# Patient Record
Sex: MALE
[De-identification: names, ages, dates, MRNs, and addresses within clinical notes are randomized per-mention and may not be internally consistent; named-entity substitution may affect disease eponyms.]

---

## 2018-02-27 ENCOUNTER — APPOINTMENT (OUTPATIENT)
Dept: NEUROLOGY | Facility: CLINIC | Age: 58
End: 2018-02-27
Payer: MEDICAID

## 2018-02-27 VITALS
OXYGEN SATURATION: 100 % | HEIGHT: 71 IN | HEART RATE: 98 BPM | DIASTOLIC BLOOD PRESSURE: 89 MMHG | SYSTOLIC BLOOD PRESSURE: 148 MMHG | WEIGHT: 180 LBS | BODY MASS INDEX: 25.2 KG/M2

## 2018-02-27 DIAGNOSIS — Z86.79 PERSONAL HISTORY OF OTHER DISEASES OF THE CIRCULATORY SYSTEM: ICD-10-CM

## 2018-02-27 DIAGNOSIS — Z86.718 PERSONAL HISTORY OF OTHER VENOUS THROMBOSIS AND EMBOLISM: ICD-10-CM

## 2018-02-27 DIAGNOSIS — Z78.9 OTHER SPECIFIED HEALTH STATUS: ICD-10-CM

## 2018-02-27 DIAGNOSIS — Z83.3 FAMILY HISTORY OF DIABETES MELLITUS: ICD-10-CM

## 2018-02-27 DIAGNOSIS — F17.200 NICOTINE DEPENDENCE, UNSPECIFIED, UNCOMPLICATED: ICD-10-CM

## 2018-02-27 DIAGNOSIS — Z86.39 PERSONAL HISTORY OF OTHER ENDOCRINE, NUTRITIONAL AND METABOLIC DISEASE: ICD-10-CM

## 2018-02-27 DIAGNOSIS — Z80.3 FAMILY HISTORY OF MALIGNANT NEOPLASM OF BREAST: ICD-10-CM

## 2018-02-27 DIAGNOSIS — Z60.2 PROBLEMS RELATED TO LIVING ALONE: ICD-10-CM

## 2018-02-27 DIAGNOSIS — I87.2 VENOUS INSUFFICIENCY (CHRONIC) (PERIPHERAL): ICD-10-CM

## 2018-02-27 DIAGNOSIS — Z82.49 FAMILY HISTORY OF ISCHEMIC HEART DISEASE AND OTHER DISEASES OF THE CIRCULATORY SYSTEM: ICD-10-CM

## 2018-02-27 DIAGNOSIS — Z86.69 PERSONAL HISTORY OF OTHER DISEASES OF THE NERVOUS SYSTEM AND SENSE ORGANS: ICD-10-CM

## 2018-02-27 PROCEDURE — 99203 OFFICE O/P NEW LOW 30 MIN: CPT

## 2018-02-27 RX ORDER — ASPIRIN 81 MG
81 TABLET, DELAYED RELEASE (ENTERIC COATED) ORAL DAILY
Qty: 90 | Refills: 1 | Status: ACTIVE | COMMUNITY

## 2018-02-27 RX ORDER — ACETAMINOPHEN 325 MG/1
325 TABLET, FILM COATED ORAL
Refills: 0 | Status: ACTIVE | COMMUNITY

## 2018-02-27 RX ORDER — METOPROLOL TARTRATE 100 MG/1
100 TABLET, FILM COATED ORAL DAILY
Refills: 0 | Status: ACTIVE | COMMUNITY

## 2018-02-27 RX ORDER — LISINOPRIL 20 MG/1
20 TABLET ORAL DAILY
Refills: 0 | Status: ACTIVE | COMMUNITY

## 2018-02-27 RX ORDER — ATORVASTATIN CALCIUM 80 MG/1
80 TABLET, FILM COATED ORAL DAILY
Qty: 30 | Refills: 6 | Status: ACTIVE | COMMUNITY

## 2018-02-27 SDOH — SOCIAL STABILITY - SOCIAL INSECURITY: PROBLEMS RELATED TO LIVING ALONE: Z60.2

## 2018-04-10 ENCOUNTER — LABORATORY RESULT (OUTPATIENT)
Age: 58
End: 2018-04-10

## 2018-04-10 ENCOUNTER — APPOINTMENT (OUTPATIENT)
Dept: NEUROLOGY | Facility: CLINIC | Age: 58
End: 2018-04-10
Payer: MEDICAID

## 2018-04-10 VITALS — WEIGHT: 187 LBS | BODY MASS INDEX: 26.08 KG/M2

## 2018-04-10 VITALS
SYSTOLIC BLOOD PRESSURE: 133 MMHG | BODY MASS INDEX: 25.2 KG/M2 | HEART RATE: 98 BPM | HEIGHT: 71 IN | WEIGHT: 180 LBS | DIASTOLIC BLOOD PRESSURE: 78 MMHG | OXYGEN SATURATION: 99 %

## 2018-04-10 DIAGNOSIS — Z00.00 ENCOUNTER FOR GENERAL ADULT MEDICAL EXAMINATION W/OUT ABNORMAL FINDINGS: ICD-10-CM

## 2018-04-10 PROCEDURE — 95913 NRV CNDJ TEST 13/> STUDIES: CPT

## 2018-04-10 PROCEDURE — 95885 MUSC TST DONE W/NERV TST LIM: CPT | Mod: 59

## 2018-04-11 LAB
ACE BLD-CCNC: 6 U/L
ALBUMIN MFR SERPL ELPH: 53.5 %
ALBUMIN SERPL-MCNC: 4.5 G/DL
ALBUMIN/GLOB SERPL: 1.2 RATIO
ALPHA1 GLOB MFR SERPL ELPH: 3.6 %
ALPHA1 GLOB SERPL ELPH-MCNC: 0.3 G/DL
ALPHA2 GLOB MFR SERPL ELPH: 10.6 %
ALPHA2 GLOB SERPL ELPH-MCNC: 0.9 G/DL
B-GLOBULIN MFR SERPL ELPH: 9.4 %
B-GLOBULIN SERPL ELPH-MCNC: 0.8 G/DL
CRP SERPL-MCNC: <0.2 MG/DL
ENA RNP AB SER IA-ACNC: 0.3 AL
ENA SM AB SER IA-ACNC: <0.2 AL
ENA SS-A AB SER IA-ACNC: <0.2 AL
ENA SS-B AB SER IA-ACNC: <0.2 AL
ERYTHROCYTE [SEDIMENTATION RATE] IN BLOOD BY WESTERGREN METHOD: 30 MM/HR
GAMMA GLOB FLD ELPH-MCNC: 1.9 G/DL
GAMMA GLOB MFR SERPL ELPH: 22.9 %
GLIADIN IGA SER QL: <5 UNITS
GLIADIN IGG SER QL: <5 UNITS
GLIADIN PEPTIDE IGA SER-ACNC: NEGATIVE
GLIADIN PEPTIDE IGG SER-ACNC: NEGATIVE
INTERPRETATION SERPL IEP-IMP: NORMAL
M PROTEIN SPEC IFE-MCNC: NORMAL
PROT SERPL-MCNC: 8.4 G/DL
PROT SERPL-MCNC: 8.4 G/DL
RHEUMATOID FACT SER QL: <7 IU/ML
TSH SERPL-ACNC: 1.85 UIU/ML
TTG IGA SER IA-ACNC: <5 UNITS
TTG IGA SER-ACNC: NEGATIVE
TTG IGG SER IA-ACNC: <5 UNITS
TTG IGG SER IA-ACNC: NEGATIVE

## 2018-04-12 LAB
ANA SER IF-ACNC: NEGATIVE
ENDOMYSIUM IGA SER QL: NEGATIVE
ENDOMYSIUM IGA TITR SER: NORMAL

## 2018-04-13 LAB
ALBUPE: 25.9 %
ALPHA1UPE: 6.3 %
ALPHA2UPE: 17.2 %
BETAUPE: 15.2 %
CREAT 24H UR-MCNC: NORMAL G/24 H
CREATININE UR (MAYO): 270 MG/DL
CRYOGLOB SERPL-MCNC: NEGATIVE
GAMMAUPE: 35.4 %
IGA 24H UR QL IFE: NORMAL
KAPPA LC 24H UR QL: PRESENT
PROT PATTERN 24H UR ELPH-IMP: NORMAL
PROT UR-MCNC: 16 MG/DL
PROT UR-MCNC: 16 MG/DL
SPECIMEN VOL 24H UR: NORMAL

## 2018-04-16 LAB — HU AB SER QL: NEGATIVE

## 2018-04-17 LAB — MAG AB SER QL: NEGATIVE

## 2018-05-29 ENCOUNTER — APPOINTMENT (OUTPATIENT)
Dept: NEUROLOGY | Facility: CLINIC | Age: 58
End: 2018-05-29
Payer: MEDICAID

## 2018-05-29 VITALS
WEIGHT: 198 LBS | SYSTOLIC BLOOD PRESSURE: 129 MMHG | DIASTOLIC BLOOD PRESSURE: 81 MMHG | HEIGHT: 71 IN | BODY MASS INDEX: 27.72 KG/M2 | HEART RATE: 91 BPM | OXYGEN SATURATION: 98 %

## 2018-05-29 PROCEDURE — 62270 DX LMBR SPI PNXR: CPT

## 2018-05-29 PROCEDURE — 64611 CHEMODENERV SALIV GLANDS: CPT

## 2018-05-29 RX ORDER — CLOPIDOGREL 75 MG/1
75 TABLET, FILM COATED ORAL DAILY
Refills: 0 | Status: DISCONTINUED | COMMUNITY
End: 2018-05-29

## 2018-05-30 LAB
ALBUMIN CSF ELPH-MCNC: 34.5 MG/DL
ALBUMIN SERPL-MCNC: 3809 MG/DL
ALBUMIN/GLOB CSF: 0.29 RATIO
APPEARANCE CSF: CLEAR
CELL FRACT CSF-IMP: NORMAL
COLOR CSF: NORMAL
GAMMA GLOB CSF ELPH-MCNC: 9.9 MG/DL
GAMMA GLOB MFR CSF ELPH: 2.2 MG/DAY
GLUCOSE CSF-MCNC: 103 MG/DL
IGG SER QL IEP: 1998 MG/DL
IGG/ALB CLEAR SER+CSF-RTO: 0.5
IGG/ALB SER: 0.52 RATIO
NEUTROPHILS NFR CSF MANUAL: NORMAL
PROT CSF-MCNC: 51 MG/DL
RBC # CSF MANUAL: 0 /UL
TOTAL CELLS COUNTED CSF: 0 /UL
TUBE TYPE: NORMAL

## 2018-06-12 ENCOUNTER — APPOINTMENT (OUTPATIENT)
Dept: NEUROLOGY | Facility: CLINIC | Age: 58
End: 2018-06-12

## 2018-06-19 LAB
ALBUMIN CSF ELPH-MCNC: 34.5 MG/DL
ALBUMIN MFR CSF ELPH: 51 MG/DL
ALBUMIN/GLOB CSF: 0.29 RATIO
GAMMA GLOB CSF ELPH-MCNC: 9.9 MG/DL
GAMMA GLOB MFR CSF ELPH: 2.2 MG/DAY
OLIGOCLONAL BANDS CSF ELPH-IMP: NORMAL
OLIGOCLONAL BANDS CSF ELPH-IMP: NORMAL
PROT CSF-MCNC: 51 MG/DL
PROT PATTERN CSF ELPH-IMP: NORMAL
SULFATIDE AB SER QL: NORMAL

## 2018-06-26 ENCOUNTER — MEDICATION RENEWAL (OUTPATIENT)
Age: 58
End: 2018-06-26

## 2018-09-07 ENCOUNTER — MEDICATION RENEWAL (OUTPATIENT)
Age: 58
End: 2018-09-07

## 2018-10-01 ENCOUNTER — MEDICATION RENEWAL (OUTPATIENT)
Age: 58
End: 2018-10-01

## 2019-02-28 ENCOUNTER — APPOINTMENT (OUTPATIENT)
Dept: NEUROLOGY | Facility: CLINIC | Age: 59
End: 2019-02-28

## 2019-04-22 ENCOUNTER — MEDICATION RENEWAL (OUTPATIENT)
Age: 59
End: 2019-04-22

## 2019-07-05 ENCOUNTER — APPOINTMENT (OUTPATIENT)
Dept: NEUROLOGY | Facility: CLINIC | Age: 59
End: 2019-07-05

## 2019-11-08 ENCOUNTER — APPOINTMENT (OUTPATIENT)
Dept: NEUROLOGY | Facility: CLINIC | Age: 59
End: 2019-11-08

## 2020-04-20 ENCOUNTER — RX RENEWAL (OUTPATIENT)
Age: 60
End: 2020-04-20

## 2020-05-19 ENCOUNTER — RX RENEWAL (OUTPATIENT)
Age: 60
End: 2020-05-19

## 2020-06-22 ENCOUNTER — RX RENEWAL (OUTPATIENT)
Age: 60
End: 2020-06-22

## 2020-07-24 ENCOUNTER — APPOINTMENT (OUTPATIENT)
Dept: NEUROLOGY | Facility: CLINIC | Age: 60
End: 2020-07-24
Payer: MEDICARE

## 2020-07-24 PROCEDURE — 99214 OFFICE O/P EST MOD 30 MIN: CPT | Mod: 95

## 2020-07-24 RX ORDER — GABAPENTIN 800 MG/1
800 TABLET, COATED ORAL 3 TIMES DAILY
Qty: 180 | Refills: 0 | Status: DISCONTINUED | COMMUNITY
Start: 2020-06-22 | End: 2020-07-24

## 2020-07-24 RX ORDER — GABAPENTIN 800 MG/1
800 TABLET, COATED ORAL 3 TIMES DAILY
Qty: 180 | Refills: 0 | Status: DISCONTINUED | COMMUNITY
Start: 2020-05-19 | End: 2020-07-24

## 2020-07-24 NOTE — ASSESSMENT
[FreeTextEntry1] : Switch gabapentin to lyrica 200mg TID\par Continue effexor\par May need to take extra gabapentin as needed\par f/u in person in 6-8 weeks

## 2020-07-24 NOTE — HISTORY OF PRESENT ILLNESS
[FreeTextEntry1] : \par Last seen about 2 years ago\par Pain is getting worse, shooting pain throughout his body\par Also feels he is losing muscle tone in his legs and arms\par He feels that gabapentin is not as effective anymore \par He stopped effexor for a while since he couldn't get a refill; after restarting it a month ago pain has been better somewhat

## 2020-09-11 ENCOUNTER — NON-APPOINTMENT (OUTPATIENT)
Age: 60
End: 2020-09-11

## 2020-09-11 ENCOUNTER — APPOINTMENT (OUTPATIENT)
Dept: NEUROLOGY | Facility: CLINIC | Age: 60
End: 2020-09-11
Payer: MEDICARE

## 2020-09-11 VITALS
SYSTOLIC BLOOD PRESSURE: 130 MMHG | TEMPERATURE: 97.9 F | HEIGHT: 71 IN | DIASTOLIC BLOOD PRESSURE: 95 MMHG | OXYGEN SATURATION: 97 % | HEART RATE: 121 BPM | WEIGHT: 190 LBS | BODY MASS INDEX: 26.6 KG/M2

## 2020-09-11 DIAGNOSIS — G62.89 OTHER SPECIFIED POLYNEUROPATHIES: ICD-10-CM

## 2020-09-11 PROCEDURE — 99214 OFFICE O/P EST MOD 30 MIN: CPT

## 2020-09-11 RX ORDER — PREGABALIN 200 MG/1
200 CAPSULE ORAL
Qty: 90 | Refills: 5 | Status: DISCONTINUED | COMMUNITY
Start: 2020-07-24 | End: 2020-09-11

## 2020-09-11 NOTE — HISTORY OF PRESENT ILLNESS
[FreeTextEntry1] : Neuropathic pain has been worse \par He stopped taking lyrica with some increase in pain\par He restarted effexor with some improvement\par Pain is all over body - feet, legs, arms, neck\par Feels like burning, tingling, numbness, severe\par \par Reviewed: MRI L spine from 2018; labs from 2018 including CSF\par \par PMH: diet controlled DM\par \par ROS: no weakness; + imbalance

## 2020-09-11 NOTE — ASSESSMENT
[FreeTextEntry1] : Stop lyrica\par continue gabapentin 1600mg TID\par increase effexor to 75mg BID\par neck stretching exercises through exercise Rx\par f/u 2-3 months consider occipital nerve blocks at that time\par Consider nortriptyline or spinal cord stimulator in the future

## 2020-09-11 NOTE — PHYSICAL EXAM
[FreeTextEntry1] : Constitutional: alert and in no acute distress. \par Psychiatric: oriented to person, place, and time, insight and judgment were intact and the affect was normal. \par Neurologic: \par Attention: normal concentrating ability. \par Language: fluency intact and comprehension intact. \par Fund of knowledge: displays adequate knowledge of personal past history and adequate range of vocabulary. \par Cranial Nerves: visual acuity intact bilaterally, visual fields full to confrontation, pupils equal round and reactive to light, extraocular motion intact, facial sensation intact symmetrically, face symmetrical, hearing was intact bilaterally, tongue and palate midline, head turning and shoulder shrug symmetric and there was no tongue deviation with protrusion. \par \par Motor: \par Atrophy of intrinsic foot muscles b/l; EHL/EDB 2/5 otherwise 5/5 throughout \par \par Sensory exam: Romberg's sign was negative. Cold sensation diminished in length dependent manner in legs b/l; vibration absent in feet, mod-sev reduced at ankles, mildly at knees. \par \par Coordination: Finger to nose dysmetria was not present. \par \par Gait: right foot everted; narrow base, sways mildly, cannot walk on heels or toes, cannot tandem. \par \par Deep tendon reflexes: \par Triceps 1+ b/l, absent otherwise \par \par MSK: severe tenderness of cervical paraspinals at insertion of occipiut R > L

## 2020-12-23 ENCOUNTER — APPOINTMENT (OUTPATIENT)
Dept: NEUROLOGY | Facility: CLINIC | Age: 60
End: 2020-12-23

## 2021-01-12 ENCOUNTER — APPOINTMENT (OUTPATIENT)
Dept: NEUROLOGY | Facility: CLINIC | Age: 61
End: 2021-01-12
Payer: MEDICARE

## 2021-01-12 PROCEDURE — 99213 OFFICE O/P EST LOW 20 MIN: CPT | Mod: 95

## 2021-01-12 RX ORDER — VENLAFAXINE 75 MG/1
75 TABLET ORAL TWICE DAILY
Qty: 180 | Refills: 3 | Status: DISCONTINUED | COMMUNITY
Start: 2018-06-26 | End: 2021-01-12

## 2021-01-12 RX ORDER — GABAPENTIN 800 MG/1
800 TABLET, COATED ORAL 3 TIMES DAILY
Qty: 90 | Refills: 5 | Status: DISCONTINUED | COMMUNITY
Start: 2018-04-10 | End: 2021-01-12

## 2021-01-12 NOTE — ASSESSMENT
[FreeTextEntry1] : Stop effexor \par Start nortriptyline 25 at night after 1 week increase to 25 BID\par continue gabapentin - try to limit to 1600mg TID \par f/u in 1 month

## 2021-01-12 NOTE — HISTORY OF PRESENT ILLNESS
[FreeTextEntry1] : \par Neuropathic pain is still bad - back to taking gabapentin 2400mg TID \par He feels that effexor aggravates the pain \par He saw pain management years ago for post-op pain\par

## 2021-01-12 NOTE — REASON FOR VISIT
[Follow-Up: _____] : a [unfilled] follow-up visit [Home] : at home, [unfilled] , at the time of the visit. [Medical Office: ([place] [facility])___] : at the medical office located in  [Verbal consent obtained from patient] : the patient, [unfilled]

## 2021-02-22 ENCOUNTER — APPOINTMENT (OUTPATIENT)
Dept: NEUROLOGY | Facility: CLINIC | Age: 61
End: 2021-02-22
Payer: MEDICARE

## 2021-02-22 PROCEDURE — 99213 OFFICE O/P EST LOW 20 MIN: CPT | Mod: 95

## 2021-02-22 RX ORDER — NORTRIPTYLINE HYDROCHLORIDE 25 MG/1
25 CAPSULE ORAL
Qty: 60 | Refills: 5 | Status: DISCONTINUED | COMMUNITY
Start: 2021-01-12 | End: 2021-02-22

## 2021-02-22 NOTE — ASSESSMENT
[FreeTextEntry1] : Continue gabapentin for now - can take 1600mg QID (encouraged to stick to that dosing)\par Consider "washout" period in a few months if symptoms are controlled, then going on lyrica or potentially a lower dose of gabapentin - he is in agreement

## 2021-02-22 NOTE — HISTORY OF PRESENT ILLNESS
[FreeTextEntry1] : He tried nortriptyline but it made him too drowsy so he stopped\par He is still taking gabapentin 2400mg TID, which takes about two hours to kick in, but overall controls the pain\par Balance is ok, walks for short periods and perform all usual functions, but gets tired easily

## 2021-02-22 NOTE — REASON FOR VISIT
[Follow-Up: _____] : a [unfilled] follow-up visit [Home] : at home, [unfilled] , at the time of the visit. [Medical Office: ([facility])___] : at the medical office located in  [Verbal consent obtained from patient] : the patient, [unfilled]

## 2021-04-09 RX ORDER — GABAPENTIN 800 MG/1
800 TABLET, COATED ORAL 4 TIMES DAILY
Qty: 720 | Refills: 3 | Status: ACTIVE | COMMUNITY
Start: 2020-04-20

## 2021-10-01 ENCOUNTER — NON-APPOINTMENT (OUTPATIENT)
Age: 61
End: 2021-10-01

## 2021-10-01 ENCOUNTER — APPOINTMENT (OUTPATIENT)
Dept: NEUROLOGY | Facility: CLINIC | Age: 61
End: 2021-10-01
Payer: MEDICARE

## 2021-10-01 DIAGNOSIS — G62.9 POLYNEUROPATHY, UNSPECIFIED: ICD-10-CM

## 2021-10-01 DIAGNOSIS — M79.2 NEURALGIA AND NEURITIS, UNSPECIFIED: ICD-10-CM

## 2021-10-01 PROCEDURE — 99213 OFFICE O/P EST LOW 20 MIN: CPT | Mod: 95

## 2021-10-01 NOTE — ASSESSMENT
[FreeTextEntry1] : Encouraged again to go down on gabapentin to 1600mg QID for now, and will continue to taper as tolerated\par Will add tramadol 50mg q6h PRN, can increase to 100mg if needed

## 2021-10-01 NOTE — HISTORY OF PRESENT ILLNESS
[FreeTextEntry1] : \par Since last visit in February 2021, he had another heart attack, complicated by severe lower extremity edema\par Neuropathy is about the same, maybe a little bit worse\par He's feeling it more in the fingers than before \par He is still taking gabapentin 2400mg QID

## 2021-10-01 NOTE — REASON FOR VISIT
[Home] : at home, [unfilled] , at the time of the visit. [Medical Office: ([facility])___] : at the medical office located in  [Verbal consent obtained from patient] : the patient, [unfilled]

## 2021-10-11 RX ORDER — TRAMADOL HYDROCHLORIDE 50 MG/1
50 TABLET, COATED ORAL
Qty: 120 | Refills: 5 | Status: ACTIVE | COMMUNITY
Start: 2021-10-01 | End: 1900-01-01

## 2022-02-09 ENCOUNTER — APPOINTMENT (OUTPATIENT)
Dept: NEUROLOGY | Facility: CLINIC | Age: 62
End: 2022-02-09